# Patient Record
(demographics unavailable — no encounter records)

---

## 2024-11-22 NOTE — PHYSICAL EXAM
[TextEntry] : PHYSICAL EXAM  General: The patient was alert and oriented and in no distress. Voice was normal.    Face: The patient had no facial asymmetry or mass. The skin was unremarkable.  Ears: External ears were normal without deformity. Ear canals discolored cyst right anterior canal wall. Tympanic membranes were intact and normal. No perforation or effusion I removed impacted cerumen from the right ear under the microscope with curet, forceps and suction  Nose:  The external nose had no significant deformity.  There was no facial tenderness.  On anterior rhinoscopy, the nasal mucosa was normal.  The anterior septum was normal. There were no visualized polyps purulence  or masses.  Oral cavity: The oral mucosa was normal. The oral and base of tongue were clear and without mass. The gingival and buccal mucosa were moist and without lesions. The palate moved well. There was no cleft palate. There appeared to be good salivary flow.   There was no pus, erythema or mass in the oral cavity/oropharynx.  Neck:  The neck was symmetrical. The parotid and submandibular glands were normal without masses. The trachea was midline and there was no unusual crepitus. Thyroid was smooth and nontender and no masses were palpated. Cervical adenopathy- none.  Procedure: Fiberoptic Nasolaryngoscopy Dx: Dysphagia, LPRD, Hoarseness Dr. Conner Doss Anesthetic: Lidocaine and oxymetazoline topical   Findings: The flexible scope was easily passed via the nose. The larynx was grossly normal. The epiglottis was normal. The hypopharynx and base of tongue were normal. The vallecula was normal. The vocal folds were grossly normal and moved normally. There are  mild changes of laryngopharyngeal reflux  (LPR)  manifest by mild ventricular swelling,  posterior commissure thickening.   No lesions were noted.

## 2024-11-22 NOTE — HISTORY OF PRESENT ILLNESS
[de-identified] : LOLI MCGUIRE  is a 64 year man with a history of LPR can't take reflux meds. He is on reflux diet and alkaline water and is better but still very symptomatic. He is having heartburn.

## 2024-11-22 NOTE — REVIEW OF SYSTEMS
[Throat Clearing] : throat clearing [Throat Pain] : throat pain [Negative] : Heme/Lymph [Patient Intake Form Reviewed] : Patient intake form was reviewed

## 2024-11-22 NOTE — ASSESSMENT
[FreeTextEntry1] : LOLI MCGUIRE has continued reflux. I will send him for esophagram.

## 2024-12-09 NOTE — PHYSICAL EXAM
Refilled Symbicort x1. Patient updated via detailed voicemail.    [Respiratory Effort] : normal respiratory effort [No Rash or Lesion] : No rash or lesion [Alert] : alert [Calm] : calm [JVD] : no jugular venous distention  [de-identified] : healthy [de-identified] : normal [de-identified] : soft and flat abdomen [de-identified] : reducible left inguinal hernia, reducible umbilical hernia

## 2024-12-09 NOTE — CONSULT LETTER
[Dear  ___] : Dear  [unfilled], [Courtesy Letter:] : I had the pleasure of seeing your patient, [unfilled], in my office today. [Please see my note below.] : Please see my note below. [Consult Closing:] : Thank you very much for allowing me to participate in the care of this patient.  If you have any questions, please do not hesitate to contact me. [Sincerely,] : Sincerely, [FreeTextEntry3] :     Mary Mejía PA-C, MSPAS [DrFelipe  ___] : Dr. LOONEY

## 2024-12-09 NOTE — ASSESSMENT
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 Denies any persistent symptoms in the right groin. He exercises regularly, using both free weights and weight machines.   He actually was evaluated by a surgeon at [facility], who recommended surgical intervention but would like a second opinion today.   Physical examination demonstrates a large tender bulge in the left groin which is easily reducible consistent with a large symptomatic protruding left inguinal hernia warranting surgical repair. There is no evidence of incarceration or strangulation, and the patient denies any symptoms of obstruction. Examination of his right groin demonstrates a moderate weakness but no obvious hernia.  His umbilical examination demonstrates a small blueberry sized bulge, which is also easily reducible, consistent with an umbilical hernia warranting concurrent repair. His current BMI is 25.  I explained the pros and cons of surgery, as well as all risks, benefits, indications and alternatives of the procedure and the patient understood and agreed. Yeyo was scheduled for the repair of his left inguinal hernia with mesh and umbilical hernia repair with mesh on Friday, January 24, 2025, under LOCAL with IV SEDATION at [facility] with presurgical testing waived. He was encouraged to avoid heavy lifting and strenuous activity in the interim, of course.  He is traveling to [place] in early January and was cautioned to avoid lifting heavy suitcases during his trip.

## 2025-02-03 NOTE — ASSESSMENT
[FreeTextEntry1] : LOLI MCGUIRE underwent a left inguinal hernia repair with mesh and an incarcerated umbilical hernia repair with mesh with Dr. Michael on 1/24/25 under local IV sedation without any problems or complications. His wounds are clean, dry and intact. There is no evidence of erythema, seroma formation or infection. He is tolerating a diet and having normal bowel movements. He denies any significant postoperative pain or discomfort at this time.   He was counseled and reassured. LOLI was discharged from the office with no specific follow up necessary, but he knows to avoid any heavy lifting or strenuous activity for the next several weeks.

## 2025-05-02 NOTE — ASSESSMENT
[FreeTextEntry1] : 1) chest discomfort currently resolved likely GI related following with GI (GERD symptoms) cardiac workup include echo and CCTA are unremarkable.   2)Palpitations: intermittent currently resolved  with lifestyle modification  able to taper off metoprolol by himself with no issues  EKG normal sinus rhythm  MCOT no major events   2)DL on  repatha following with PMD   3)HTN controlled currently  continue ARB   Patient advised to check BP at home, low salt diet avoid NSAIDS.   heart healthy diet, regular exercise and weight control discussed  Follow up in 6 months

## 2025-05-02 NOTE — HISTORY OF PRESENT ILLNESS
[FreeTextEntry1] : I had the pleasure to see Mr Meza today for cardiology follow up  . He is 62 year old male patient PMHx of DL, pre diabetes and  GERD. no Known previous cardiac disease, non smoker. Initially he was evaluated for  chronic intermitted chest non specific He is active at baseline. EKG normal sinus rhythm, no acute ischemia.  cardiac workup include echo showed normal EF unremarkable  1/2023 CCTA 3/2023 showed patent normal coronary arteries Ca Score 5  Currently patient is stable  denies chest pain, shortness of breath for ROSE. no syncope MCOT 6/2023 for  30 days no significant arrhythmias detected, (few PVC's) no more palpitations, able to taper off BB  BPcontrolled on current regimen of ARB

## 2025-05-02 NOTE — REVIEW OF SYSTEMS
[SOB] : no shortness of breath [Dyspnea on exertion] : not dyspnea during exertion [Chest Discomfort] : no chest discomfort [Leg Claudication] : no intermittent leg claudication [Palpitations] : no palpitations [Orthopnea] : no orthopnea [Syncope] : no syncope [Anxiety] : anxiety [Negative] : Neurological

## 2025-05-07 NOTE — LETTER BODY
[Dear  ___] : Dear  [unfilled], [Courtesy Letter:] : I had the pleasure of seeing your patient, [unfilled], in my office today. [Please see my note below.] : Please see my note below. [Consult Closing:] : Thank you very much for allowing me to participate in the care of this patient.  If you have any questions, please do not hesitate to contact me. [Sincerely,] : Sincerely, [FreeTextEntry3] : Annita Vega MD

## 2025-05-07 NOTE — HISTORY OF PRESENT ILLNESS
[FreeTextEntry1] : 4/23/24: 63 year old man with pre-diabetes, GERD presents with frequent urination for few years. He also has frequency, urgency, straining, feeling of incomplete emptying. He saw a urologist in 202-2021 for a workup for BPH. Had "many tests" including a cystoscopy where he was told his prostate was "lemon sized". After the cystoscopy he developed a severe UTI with fever and shaking chills. He was supposed to undergo a UDS but did not go back. During this time he was taking tamsulosin but he could not tolerate it due to side effects of brain fog and fatigue. It made the stream stronger, but did not help with nocturia.   PSA in 1/2024 was 1.45.   IPSS 22, QOL 5 PVR: 86 ml   Previous urologist: Dr. Grey [address] PCP: Dr. Cunningham   5/7/25: Stopped tadalafil 5 mg daily after 6 weeks. Did not follow up after first appointment. Had stronger stream, but found the nocturia did not improve. Stopped due to headaches, side effects.   Remains very bothered by nocturia. Only improves with advil. Took antibiotics earlier this year after hernia surgery, also felt improvement. Nocturia 3-4 times at night most nights, difficulty urinating at night as well. Pain prior to urination with urgency.  Daytime symptoms are minimal. Urinates every 3-4 hours without much urgency.  PSA 1.22 on 4/18/25.   PVR 9 cc

## 2025-05-07 NOTE — ASSESSMENT
[FreeTextEntry1] : 63 year old male with BPH/LUTS, most bothered by nocturia. Unable to tolerate flomax or tadalafil, though nocturia did not improve with either. Only sees improvement in nocturia when taking advil and perhaps when he took antibiotics this year. Discussed potential etiologies including BPH with bladder outlet obstruction, OAB, prostatitis, pelvic floor dysfunction. Although his symptoms are long-standing, it is interesting that he sees improvement with NSAIDs or antibiotics. May indicate prostatitis. A post-prostate massage urine sample was obtained and will be snet for culture. If positive, will plan for culture directed antibiotics. If negative, would still consider 1-2 weeks empiric antibiotics. If no improvement with antibiotics, recommend UDS to better assess degree of outlet obstruction vs bladder dysfunction. He is potentially interested in prostate procedure if UDS indicates obstruction, though I did indicate nocturia does not always improve with BPH procedures. I suggested a sleep study and we had previously discussed performing voiding diary to determine possibility of nocturnal polyuria, however, patient feels this would be too difficult to complete.   - F/U post prostate massage urine sample. Consider antibiotics for prostatitis. - Behavioral modifications: limit fluid intake prior to bedtime, limit caffeine and alcohol intake - RTC 1 month, if no improvement, consider UDS and TRUS

## 2025-06-18 NOTE — PHYSICAL EXAM
[Normal Appearance] : normal appearance [Well Groomed] : well groomed [General Appearance - In No Acute Distress] : no acute distress [Edema] : no peripheral edema [Respiration, Rhythm And Depth] : normal respiratory rhythm and effort [Exaggerated Use Of Accessory Muscles For Inspiration] : no accessory muscle use [Abdomen Soft] : soft [Abdomen Tenderness] : non-tender [Costovertebral Angle Tenderness] : no ~M costovertebral angle tenderness [Urinary Bladder Findings] : the bladder was normal on palpation [Normal Station and Gait] : the gait and station were normal for the patient's age [No Focal Deficits] : no focal deficits [] : no rash [Oriented To Time, Place, And Person] : oriented to person, place, and time [Affect] : the affect was normal [Mood] : the mood was normal

## 2025-06-18 NOTE — ASSESSMENT
[FreeTextEntry1] : 63 year old male with BPH/LUTS, most bothered by nocturia. Unable to tolerate flomax or tadalafil, though nocturia did not improve with either. Only sees improvement in nocturia when taking advil and perhaps when he took antibiotics this year. We tried course of bactrim for possible prostatitis without any change in symptoms.   Discussed potential etiologies including BPH with bladder outlet obstruction, OAB, prostatitis, pelvic floor dysfunction. Recommend UDS to better assess degree of outlet obstruction vs bladder dysfunction. He is potentially interested in prostate procedure if UDS indicates obstruction, though I did indicate nocturia does not always improve with BPH procedures. I suggested a sleep study and we had previously discussed performing voiding diary to determine possibility of nocturnal polyuria, however, patient feels this would be too difficult to complete.   - UDS  - Discussed potential BPH procedure options if indicate

## 2025-06-18 NOTE — HISTORY OF PRESENT ILLNESS
[FreeTextEntry1] : 4/23/24: 63 year old man with pre-diabetes, GERD presents with frequent urination for few years. He also has frequency, urgency, straining, feeling of incomplete emptying. He saw a urologist in 202-2021 for a workup for BPH. Had "many tests" including a cystoscopy where he was told his prostate was "lemon sized". After the cystoscopy he developed a severe UTI with fever and shaking chills. He was supposed to undergo a UDS but did not go back. During this time he was taking tamsulosin but he could not tolerate it due to side effects of brain fog and fatigue. It made the stream stronger, but did not help with nocturia.   PSA in 1/2024 was 1.45.   IPSS 22, QOL 5 PVR: 86 ml   Previous urologist: Dr. Grey [address] [facility] PCP: Dr. Cunningham   5/7/25: Stopped tadalafil 5 mg daily after 6 weeks. Did not follow up after first appointment. Had stronger stream, but found the nocturia did not improve. Stopped due to headaches, side effects.   Remains very bothered by nocturia. Only improves with advil. Took antibiotics earlier this year after hernia surgery, also felt improvement. Nocturia 3-4 times at night most nights, difficulty urinating at night as well. Pain prior to urination with urgency.  Daytime symptoms are minimal. Urinates every 3-4 hours without much urgency.  PSA 1.22 on 4/18/25.   PVR 9 cc   6/18/25: Took bactrim for possible prostatitis without any change in urinary symptoms. Still with nocturia that is inconsistent.